# Patient Record
(demographics unavailable — no encounter records)

---

## 2025-05-16 NOTE — HISTORY OF PRESENT ILLNESS
[de-identified] : Mr. Latif is a very pleasant 50-year-old gentleman with a PMHx for 1.5 pack/week smoker and current daily smoker who presents for follow-up evaluation for chronic low back pain and residual paresthesias of the right lower extremity in an L4 versus L5 distribution; he is 14 months s/p right sided hemilaminectomy for L4-5 and L5-S1 decompression with placement of Duramorph paste in the epidural space on 3/22/2024. Overall, since his hemilaminectomy in March 2024 he reports significant improvement/resolution of his right lower extremity radicular symptoms with occasional/residual numbness/tingling of the right lower extremity although he states that is "1000% better than it was".  He reports ongoing bilateral lower back pain which can be tender to the touch, he is currently attending physical therapy with mild improvement of his symptoms. He denies any chico shooting pain down his lower extremities. He also reports bilateral neck pain/stiffness where he feels as though he cannot turn his head completely over the last few months.  He denies any shooting pain down his upper extremities or issues with fine motor skills or gait. He denies any bowel/bladder incontinence or saddle anesthesia. He does not follow with pain management and is not currently taking any medications for pain.

## 2025-05-16 NOTE — ASSESSMENT
[FreeTextEntry1] : Mr. Latif is a very pleasant 50-year-old gentleman with a PMHx for 1.5 pack/week smoker and current daily smoker who presents for follow-up evaluation for chronic low back pain and residual paresthesias of the right lower extremity in an L4 versus L5 distribution; he is 14 months s/p right sided hemilaminectomy for L4-5 and L5-S1 decompression with placement of Duramorph paste in the epidural space on 3/22/2024. Overall, since his hemilaminectomy in March 2024 he reports significant improvement/resolution of his right lower extremity radicular symptoms with occasional/residual numbness/tingling of the right lower extremity although he states that is "1000% better than it was", with ongoing chronic lower back pain that has been mildly improved with physical therapy.  He has also been experiencing neck pain/stiffness but no radicular symptoms of his upper extremities.  On exam he is neurologically intact with 5 out of 5 strength in bilateral upper and lower extremities, without any evidence of hyperreflexia, with mild tenderness of the bilateral lumbar paraspinal region and a negative straight leg raise bilaterally.  We discussed that neck pain and back pain is multifactorial in nature with a large musculoskeletal/muscle spasm component and is best treated with conservative methods such as continuing physical therapy for strengthening/stretching exercises as well as an evaluation with pain management for potential trigger point injections or a medication regimen.  I have provided a prescription for methocarbamol 750 mg twice daily as needed for muscle spasms as it is pain appears to be largely muscular in nature, advised him to avoid any operation of heavy machinery or driving while taking this medication.  I encouraged he follow-up with pain management for any further medication changes.  We discussed that neck pain/back pain is largely expectant management with weight loss, and avoidance of bending, lifting or twisting as well as activity modification to avoid exacerbations.  We discussed that if he were to experience any shooting pain down his lower extremities or weakness, I would be concerned for a potential reherniation, he is not currently experiencing any worsening radicular symptoms or bowel/bladder issues.  At this time, he will follow-up in approximately 2 months to discuss his overall progress, sooner if any new or worsening symptoms develop.  All questions were answered, he knows to call the office with any questions or concerns  Plan: - Referral to physical therapy for neck pain and ongoing lower back pain - Referral to pain management for potential trigger point injections, medication management  - Methocarbamol 750mg BID PRN for muscle spasm  - Follow up in 2 months to discuss overall progress

## 2025-05-16 NOTE — PHYSICAL EXAM
[de-identified] : Patient is awake and alert.  Well appearing in no acute distress Patient is interactive and appropriate RUE 5/5 Deltoid/Biceps/Triceps/WE/WF//IO LUE 5/5 Deltoid/Biceps/Triceps/WE/WF//IO RLE 5/5 HF/KE/KF/DF/PF/EHL LLE 5/5 HF/KE/KF/DF/PF/EHL Sensation intact to light touch Mild tenderness of bilateral lumbar paraspinal region  NEGATIVE straight leg raise bilaterally  Negative Mueller's bilaterally Negative clonus bilaterally Narrow-based gait within normal limits reflexes 2+ Incision is very well healed

## 2025-05-21 NOTE — HISTORY OF PRESENT ILLNESS
[FreeTextEntry1] : Mr. CLARI GREEN is a 50 year male who presents with  PMH HTN, LBP s/p R L4-L5, L5-S1 hemilaminectomy    HPI  05/21/2025 Location: low back  Onset: 1 year, patient is s/p R L4-L5, L5-S1 hemilaminectomy with Dr. Alexis for RLE radiculopathy. He reports RLE radicular pain has improved, but continues to have pain across his low back.  Provocation/Palliative: Quality: Radiation: Severity:  /10   Denies any associated numbness. Denies any associated leg weakness. Denies any loss of bowel/bladder control or any groin numbness.   Current pain medications:  methocarbamol 750mg BID PRN   Previous medications trialed:   Previous procedures relevant to complaint: Injections: none for spine or joints  Surgery:    3/22/24 - R L4-L5, L5-S1 hemilaminectomy  (Dr. Alexis)   Conservative therapy tried: Physical Therapy: + attending  HEP:      Diagnostic studies reviewed by me: XR MRI

## 2025-05-21 NOTE — PHYSICAL EXAM
[FreeTextEntry1] : Constitutional: In NAD, calm and cooperative Heart: well perfused Lungs: nonlabored breathing MSK (Back) Inspection: no gross swelling identified, no scars Palpation: Tenderness of the bilateral lower lumbar paraspinals ROM: Pain at end lumbar extension/flexion Strength: 5/5 strength in bilateral lower extremities Reflexes: 2+ Patella reflex bilaterally, 2+ Achilles reflex bilaterally, negative clonus bilaterally Sensation: Intact to light touch in bilateral lower extremities Special tests: SLR: TIFFANIE: FADIR: Pelvic Compression:  Thigh Thrust: Jennifer's Finger: Facet loading:

## 2025-05-21 NOTE — ASSESSMENT
[FreeTextEntry1] : Mr. CLARI GREEN is a 50 year male who presents with low back pain     Impression: low back pain    Plan:  - NSGY notes reviewed - MR L spine reviewed, CT L spine reviewed  - agree with referral to PT as directed by nsgy.  -     The patient expressed verbal understanding and is in agreement with the plan of care. All of the patient's questions and concerns were addressed during today's visit.

## 2025-07-22 NOTE — REASON FOR VISIT
[Follow-Up Visit] : a follow-up visit for [Back Pain] : back pain [Neck Pain] : neck pain [FreeTextEntry2] :  S/p right hemilaminectomy L4-5, L5-S1 on 3/22/2024 for RLE radiculopathy

## 2025-07-22 NOTE — HISTORY OF PRESENT ILLNESS
[de-identified] : Mr. Latif is a very pleasant 51-year-old gentleman with a PMHx for 1.5 pack/week smoker and current daily smoker who presents for follow-up evaluation for chronic low back pain and residual paresthesias of the right lower extremity in an L4 versus L5 distribution; he is 16 months s/p right sided hemilaminectomy for L4-5 and L5-S1 decompression with placement of Duramorph paste in the epidural space on 3/22/2024.  He overall reports that he is doing well and still feels that he has significantly improved compared to prior to his surgery.  He reports that he continues to have some tenderness and pain in his lower back near his incision is as well as some residual tingling and some pain down the lower extremities which is still significantly improved compared to his prior symptoms.  He reports that starting physical therapy seems to help his symptoms and he is overall managing his back pain and leg pain well and has successfully lost weight which is also helping his symptoms.  He denies any issues with his incision or any other neurological symptoms at this time.

## 2025-07-22 NOTE — PHYSICAL EXAM
[de-identified] : Patient is awake and alert. Well appearing in no acute distress Patient is interactive and appropriate RLE 5/5 HF/KE/KF/DF/PF/EHL LLE 5/5 HF/KE/KF/DF/PF/EHL Sensation intact to light touch Mild tenderness of midline lumbar spine, no significant paraspinal lumbar tenderness NEGATIVE straight leg raise bilaterally  Negative clonus bilaterally Narrow-based gait within normal limits reflexes 2+

## 2025-07-22 NOTE — ASSESSMENT
[FreeTextEntry1] : Mr. Latif is a very pleasant 51-year-old gentleman with a PMHx for 1.5 pack/week smoker and current daily smoker who presents for follow-up evaluation for chronic low back pain and residual paresthesias of the right lower extremity in an L4 versus L5 distribution; he is 16 months s/p right sided hemilaminectomy for L4-5 and L5-S1 decompression with placement of Duramorph paste in the epidural space on 3/22/2024.  He overall is doing well and continues to have improvements in his symptoms and has had benefit from physical therapy in terms of ability to walk and in terms of losing weight.  We discussed the overall management aspect of managing symptoms such as low back pain and spondylotic disease.  We discussed that should he feel that physical therapy is not sufficient for symptoms he can also consider doing pain management injections again at this time.  He reports that he would like to continue his physical therapy at this time and will defer any pain management injections as of now as he feels that he is managing sufficiently.  We discussed having him follow-up on an as-needed basis going forward and that he should feel free to follow-up should he have any new or worsening symptoms.  All questions were answered.  Plan: - Continue physical therapy for residual lower back pain  - Recommend evaluation with pain management for possible injections, medication regimen, etc. He defers referral at this time.  - Weight loss, activity modification, educated pt on maintenance of overall spine health - Follow up PRN